# Patient Record
Sex: FEMALE | Race: WHITE | NOT HISPANIC OR LATINO | Employment: STUDENT | URBAN - METROPOLITAN AREA
[De-identification: names, ages, dates, MRNs, and addresses within clinical notes are randomized per-mention and may not be internally consistent; named-entity substitution may affect disease eponyms.]

---

## 2017-04-17 ENCOUNTER — TELEPHONE (OUTPATIENT)
Dept: PEDIATRICS | Facility: CLINIC | Age: 22
End: 2017-04-17

## 2017-04-17 NOTE — TELEPHONE ENCOUNTER
----- Message from Haylie Haider sent at 4/17/2017  1:50 PM CDT -----  Contact: Lisset Tyson (Mother)  Lisset Tyson (Mother) calling to requesting a copy of the up to date immunization record for the patient. She wants to come pick it up soon. Please advise.  Call back .  Thanks!

## 2018-02-26 ENCOUNTER — TELEPHONE (OUTPATIENT)
Dept: PEDIATRICS | Facility: CLINIC | Age: 23
End: 2018-02-26

## 2018-02-26 NOTE — TELEPHONE ENCOUNTER
----- Message from Brittany Rose sent at 2/26/2018  4:13 PM CST -----  Contact: Patient mother haroon knox ph#[phone]  Patient mother haroon knox #[phone]  Requesting a call from nurse in regards to patient vaccination and shot record

## 2019-07-15 ENCOUNTER — TELEPHONE (OUTPATIENT)
Dept: INFECTIOUS DISEASES | Facility: CLINIC | Age: 24
End: 2019-07-15

## 2019-07-15 NOTE — TELEPHONE ENCOUNTER
----- Message from Gualberto Osorio MD sent at 7/15/2019  2:16 PM CDT -----  Contact: Kevon  tel:  [phone] cell  Can do [phone]  ----- Message -----  From: Andria Triplett MA  Sent: 7/15/2019   2:01 PM  To: Gualberto Osorio MD    Please advise. No one has anything available on that day either.  ----- Message -----  From: Berkley Viktoriya  Sent: 7/15/2019   1:47 PM  To: Andria Triplett MA    Needs Advice/ Dr. Osorio's Travel pt./     Reason for call:  Pt. Is asking if you can see her earlier in the day.   She is scheduled for  7/18 at  2:30pm .  Travel Pt.   /   Will be driving in from [place].         Leandro call .         Communication Preference:  Phone     Additional Information:  pls call.

## 2019-07-15 NOTE — TELEPHONE ENCOUNTER
Called patient and informed her we could see her at 11:30am. Patient accepted new appointment time.

## 2019-07-18 ENCOUNTER — OFFICE VISIT (OUTPATIENT)
Dept: INFECTIOUS DISEASES | Facility: CLINIC | Age: 24
End: 2019-07-18

## 2019-07-18 ENCOUNTER — CLINICAL SUPPORT (OUTPATIENT)
Dept: INFECTIOUS DISEASES | Facility: CLINIC | Age: 24
End: 2019-07-18

## 2019-07-18 VITALS
HEART RATE: 65 BPM | SYSTOLIC BLOOD PRESSURE: 119 MMHG | WEIGHT: 203.69 LBS | HEIGHT: 64 IN | DIASTOLIC BLOOD PRESSURE: 79 MMHG | TEMPERATURE: 98 F | BODY MASS INDEX: 34.77 KG/M2

## 2019-07-18 DIAGNOSIS — Z71.84 TRAVEL ADVICE ENCOUNTER: Primary | ICD-10-CM

## 2019-07-18 DIAGNOSIS — Z00.00 ROUTINE HEALTH MAINTENANCE: ICD-10-CM

## 2019-07-18 PROCEDURE — 99999 PR PBB SHADOW E&M-EST. PATIENT-LVL I: CPT | Mod: PBBFAC,,,

## 2019-07-18 PROCEDURE — 90717 YELLOW FEVER VACCINE SQ: ICD-10-PCS | Mod: S$GLB,,, | Performed by: INTERNAL MEDICINE

## 2019-07-18 PROCEDURE — 99999 PR PBB SHADOW E&M-EST. PATIENT-LVL III: ICD-10-PCS | Mod: PBBFAC,,, | Performed by: INTERNAL MEDICINE

## 2019-07-18 PROCEDURE — 90715 TDAP VACCINE GREATER THAN OR EQUAL TO 7YO IM: ICD-10-PCS | Mod: S$GLB,,, | Performed by: INTERNAL MEDICINE

## 2019-07-18 PROCEDURE — 90472 TYPHOID VICPS VACCINE IM: ICD-10-PCS | Mod: S$GLB,,, | Performed by: INTERNAL MEDICINE

## 2019-07-18 PROCEDURE — 90472 IMMUNIZATION ADMIN EACH ADD: CPT | Mod: S$GLB,,, | Performed by: INTERNAL MEDICINE

## 2019-07-18 PROCEDURE — 90691 TYPHOID VACCINE IM: CPT | Mod: S$GLB,,, | Performed by: INTERNAL MEDICINE

## 2019-07-18 PROCEDURE — 90691 TYPHOID VICPS VACCINE IM: ICD-10-PCS | Mod: S$GLB,,, | Performed by: INTERNAL MEDICINE

## 2019-07-18 PROCEDURE — 90717 YELLOW FEVER VACCINE SUBQ: CPT | Mod: S$GLB,,, | Performed by: INTERNAL MEDICINE

## 2019-07-18 PROCEDURE — 99401 PREV MED CNSL INDIV APPRX 15: CPT | Mod: 25,S$GLB,, | Performed by: INTERNAL MEDICINE

## 2019-07-18 PROCEDURE — 99999 PR PBB SHADOW E&M-EST. PATIENT-LVL I: ICD-10-PCS | Mod: PBBFAC,,,

## 2019-07-18 PROCEDURE — 90632 HEPA VACCINE ADULT IM: CPT | Mod: S$GLB,,, | Performed by: INTERNAL MEDICINE

## 2019-07-18 PROCEDURE — 90471 YELLOW FEVER VACCINE SQ: ICD-10-PCS | Mod: S$GLB,,, | Performed by: INTERNAL MEDICINE

## 2019-07-18 PROCEDURE — 90632 HEPATITIS A VACCINE ADULT IM: ICD-10-PCS | Mod: S$GLB,,, | Performed by: INTERNAL MEDICINE

## 2019-07-18 PROCEDURE — 99401 PR PREVENT COUNSEL,INDIV,15 MIN: ICD-10-PCS | Mod: 25,S$GLB,, | Performed by: INTERNAL MEDICINE

## 2019-07-18 PROCEDURE — 90471 IMMUNIZATION ADMIN: CPT | Mod: S$GLB,,, | Performed by: INTERNAL MEDICINE

## 2019-07-18 PROCEDURE — 99999 PR PBB SHADOW E&M-EST. PATIENT-LVL III: CPT | Mod: PBBFAC,,, | Performed by: INTERNAL MEDICINE

## 2019-07-18 PROCEDURE — 90715 TDAP VACCINE 7 YRS/> IM: CPT | Mod: S$GLB,,, | Performed by: INTERNAL MEDICINE

## 2019-07-18 RX ORDER — DEXTROAMPHETAMINE SACCHARATE, AMPHETAMINE ASPARTATE, DEXTROAMPHETAMINE SULFATE AND AMPHETAMINE SULFATE 5; 5; 5; 5 MG/1; MG/1; MG/1; MG/1
TABLET ORAL
Refills: 0 | COMMUNITY
Start: 2019-06-07

## 2019-07-18 RX ORDER — AZITHROMYCIN 500 MG/1
500 TABLET, FILM COATED ORAL DAILY
Qty: 3 TABLET | Refills: 0 | Status: SHIPPED | OUTPATIENT
Start: 2019-07-18 | End: 2019-07-21

## 2019-07-18 RX ORDER — OMEPRAZOLE 40 MG/1
CAPSULE, DELAYED RELEASE ORAL
COMMUNITY
Start: 2018-11-02

## 2019-07-18 RX ORDER — ATOVAQUONE AND PROGUANIL HYDROCHLORIDE 250; 100 MG/1; MG/1
1 TABLET, FILM COATED ORAL DAILY
Qty: 15 TABLET | Refills: 0 | Status: SHIPPED | OUTPATIENT
Start: 2019-07-18

## 2019-07-18 RX ORDER — FLUTICASONE PROPIONATE 50 MCG
1 SPRAY, SUSPENSION (ML) NASAL
COMMUNITY
Start: 2019-02-08 | End: 2020-02-08

## 2019-07-18 RX ORDER — DROSPIRENONE AND ETHINYL ESTRADIOL TABLETS 0.02-3(28)
1 KIT ORAL DAILY
Refills: 12 | COMMUNITY
Start: 2019-07-08

## 2019-07-18 RX ORDER — SERTRALINE HYDROCHLORIDE 100 MG/1
TABLET, FILM COATED ORAL
COMMUNITY

## 2019-07-18 NOTE — PROGRESS NOTES
Ms. Tyson received initial dose Hep A, Yellow Fever, Typhoid, and TDaP vaccines and tolerated them well. She left the unit in NAD.

## 2019-07-18 NOTE — PROGRESS NOTES
Travel Consult  Chief Complaint   Patient presents with    Travel Consult     [place]     Kevon Tyson is here for travel consultation.    Leaving 7/25 going to [place], coming back 8/3 for mission trip, soccer camp    [place]    Accommodations: camping and private home  Purpose of travel: missionary work  Currently ill / Fever: no  History of Splenectomy: no  The patient states that She does not live with a household member that has cancer, HIV infection, or take drugs to suppress the immune system.      Past Medical History:   Diagnosis Date    ALLERGIC RHINITIS 2/9/2012     Latex  Immunization History   Administered Date(s) Administered    DTP 1997, 1997, 1997, 07/25/1998, 07/02/2002    HIB 1997, 1997, 1997, 07/25/1998    HPV Quadrivalent 05/22/2013, 07/22/2013, 09/29/2014    Hepatitis B, Pediatric/Adolescent 1997, 1997, 1997, 07/25/1998    Influenza 12/11/2003, 07/02/2004, 11/11/2005, 10/16/2008    Influenza - Quadrivalent - PF 12/11/2003, 07/02/2004, 11/11/2005    MMR 07/25/1998, 07/02/2002    Meningococcal Conjugate 05/22/2013    Meningococcal Conjugate (MCV4P) 02/18/2009, 05/22/2013    OPV 1997, 1997, 07/25/1998, 07/02/2002    Tdap 02/18/2009    Varicella 07/25/1998, 02/18/2009     ASSESSMENT: 23 y/o F Leaving 7/25 going to [place], coming back 8/3 for mission trip, RV ID here for travel eval  - yellow fever, denies pregnancy  - hep A  - TdaP  - Typhoid IM  - malarone for malaria ppx  - azithro prn complicated diarrhea    PLAN:  1. The Patient was provided with an extensive travel guidance packet which provides travel information specific to the patients itinerary.   2. The patient's medical history was reviewed and the patient was counseled on:  · Dietary precautions.  · Personal protective measures to prevent insect-borne diseases (e.g., malaria, dengue).  · Precautions to prevent exposure to rabies  and seek treatment for possible exposures.  · Precautions against sun exposure.  · Precautions against development of DVT during flight.  · Personal and travel safety.  3. The patient's immunization history was reviewed and, based on the patient's itinerary, immunizations were ordered.    4. The patient was encouraged to contact us about any problems that may develop after immunization and possible side effects were reviewed.    5. The patient was instructed to purchase Imodium over the counter to take in case diarrhea (without blood or fever) develops.  An antibiotic was ordered for treatment if severe or bloody diarrhea develops and the patient was instructed on use and possible side effects.    6. The patient was also instructed to purchase insect repellent containing DEET or [place] and apply according to repellent label instructions.  If indicated by the patients itinerary an anti-malarial agent was prescribed for malaria prophylaxis and possible side effects were reviewed.    7. The patient was instructed to contact us if problems develop after travel.

## 2021-07-15 ENCOUNTER — CLINICAL SUPPORT (OUTPATIENT)
Dept: URGENT CARE | Facility: CLINIC | Age: 26
End: 2021-07-15

## 2021-07-15 PROCEDURE — 86580 PR  TB INTRADERMAL TEST: ICD-10-PCS | Mod: S$GLB,,, | Performed by: EMERGENCY MEDICINE

## 2021-07-15 PROCEDURE — 86580 TB INTRADERMAL TEST: CPT | Mod: S$GLB,,, | Performed by: EMERGENCY MEDICINE
